# Patient Record
Sex: MALE | Race: WHITE | NOT HISPANIC OR LATINO | Employment: FULL TIME | URBAN - METROPOLITAN AREA
[De-identification: names, ages, dates, MRNs, and addresses within clinical notes are randomized per-mention and may not be internally consistent; named-entity substitution may affect disease eponyms.]

---

## 2022-01-12 ENCOUNTER — OFFICE VISIT (OUTPATIENT)
Dept: FAMILY MEDICINE CLINIC | Facility: CLINIC | Age: 37
End: 2022-01-12

## 2022-01-12 VITALS
HEIGHT: 71 IN | HEART RATE: 77 BPM | OXYGEN SATURATION: 95 % | DIASTOLIC BLOOD PRESSURE: 80 MMHG | WEIGHT: 195.4 LBS | BODY MASS INDEX: 27.35 KG/M2 | TEMPERATURE: 97.7 F | SYSTOLIC BLOOD PRESSURE: 144 MMHG

## 2022-01-12 DIAGNOSIS — Z13.220 LIPID SCREENING: ICD-10-CM

## 2022-01-12 DIAGNOSIS — R20.0 RIGHT ARM NUMBNESS: ICD-10-CM

## 2022-01-12 DIAGNOSIS — K21.9 GASTROESOPHAGEAL REFLUX DISEASE WITHOUT ESOPHAGITIS: ICD-10-CM

## 2022-01-12 DIAGNOSIS — E66.3 OVERWEIGHT WITH BODY MASS INDEX (BMI) OF 27 TO 27.9 IN ADULT: ICD-10-CM

## 2022-01-12 DIAGNOSIS — Z00.00 PREVENTATIVE HEALTH CARE: Primary | ICD-10-CM

## 2022-01-12 DIAGNOSIS — Z72.0 TOBACCO ABUSE: ICD-10-CM

## 2022-01-12 DIAGNOSIS — Z83.3 FAMILY HISTORY OF DIABETES MELLITUS: ICD-10-CM

## 2022-01-12 PROCEDURE — 99203 OFFICE O/P NEW LOW 30 MIN: CPT | Performed by: NURSE PRACTITIONER

## 2022-01-12 RX ORDER — GABAPENTIN 300 MG/1
CAPSULE ORAL
Qty: 14 CAPSULE | Refills: 0 | Status: SHIPPED | OUTPATIENT
Start: 2022-01-12

## 2022-01-12 NOTE — PROGRESS NOTES
Ja Hetser is a 36 y.o. male.     36-year-old white male smoker with no known medical history who comes in today to be established as new patient    Blood pressure 140/80 heart rate 76 with very small systolic murmur who denies any chest pain, dyspnea, tachycardia or dizziness    Patient's only complaint is right arm numbness from the elbow down at nighttime that is now beginning to happen during the day.  He states the arm and hand go completely and flaccid but will come back to life when he starts shaking his hand.  He states is keeping him awake at night.  He denies any injuries to neck shoulder or  elbow.  He works at a [email or web address] and probably does some repetitive type of work there.  He denies any neck pain.  I Patito place him on some gabapentin temporarily at bedtime to see if it helps him sleep.  He just started a new job and has not currently got his insurance yet so he is going to call me when he does and we will do a work-up for the arm numbness.  On assessment hands and fingers were ice cold and I suspect he has some secretory issues going on causing the numbness in his right arm however radial pulses were strong.    He also has acid reflux mainly after eating anything with the tomato-based and I informed the patient his main culprit is smoking.  He is trying to cut back on smoking and does use over-the-counter antacids when he does have a flareup.    Weight is 195 BMI 27.3.  He has had 2 COVID vaccines but does need to schedule an eye exam          Gabapentin 300 mg 1-2 nightly  Call office when your health insurance kicks in   monitor blood pressure at home  Consider getting COVID booster  Schedule eye exam  Fasting blood work         The following portions of the patient's history were reviewed and updated as appropriate: allergies, current medications, past family history, past medical history, past social history, past surgical history and problem list.    Vitals:    01/12/22 1320  "01/12/22 1322   BP: 154/98 144/80   BP Location: Right arm Right arm   Patient Position: Sitting Sitting   Cuff Size: Adult Adult   Pulse: 77    Temp: 97.7 °F (36.5 °C)    TempSrc: Infrared    SpO2: 95%    Weight: 88.6 kg (195 lb 6.4 oz)    Height: 180.3 cm (71\")      Body mass index is 27.25 kg/m².    Past Medical History:   Diagnosis Date   • GERD (gastroesophageal reflux disease)      History reviewed. No pertinent surgical history.  Family History   Problem Relation Age of Onset   • Cancer Father    • Heart attack Sister      Immunization History   Administered Date(s) Administered   • COVID-19 (MODERNA) 1st, 2nd, 3rd Dose Only 09/03/2021, 10/21/2021       No results found for any previous visit.         Review of Systems   Constitutional: Negative.    HENT: Negative.    Respiratory: Negative.    Cardiovascular: Negative.    Gastrointestinal: Negative.  Positive for GERD.   Genitourinary: Negative.    Musculoskeletal: Negative.    Skin: Negative.    Neurological: Positive for numbness.   Psychiatric/Behavioral: Negative.        Objective   Physical Exam    Procedures    Assessment/Plan   Diagnoses and all orders for this visit:    1. Preventative health care (Primary)  -     Cancel: Comprehensive Metabolic Panel  -     CBC & Differential; Future  -     Comprehensive Metabolic Panel; Future    2. Lipid screening  -     Cancel: Lipid Panel With LDL / HDL Ratio  -     Lipid Panel With LDL / HDL Ratio; Future    3. Family history of diabetes mellitus  -     Hemoglobin A1c; Future  -     Comprehensive Metabolic Panel; Future    4. Tobacco abuse    5. Gastroesophageal reflux disease without esophagitis    6. Overweight with body mass index (BMI) of 27 to 27.9 in adult    7. Right arm numbness    Other orders  -     gabapentin (NEURONTIN) 300 MG capsule; 1-2 capsules 30 minutes before bedtime  Dispense: 14 capsule; Refill: 0          Current Outpatient Medications:   •  gabapentin (NEURONTIN) 300 MG capsule, 1-2 " capsules 30 minutes before bedtime, Disp: 14 capsule, Rfl: 0           Aby Yadav, APRN1/12/202214:08 EST  This note has been electronically signed

## 2022-01-12 NOTE — PATIENT INSTRUCTIONS
Gabapentin 300 mg 1-2 nightly  Call office when your health insurance kicks in   monitor blood pressure at home  Consider getting COVID booster  Schedule eye exam  Fasting blood work

## 2023-05-08 ENCOUNTER — OFFICE VISIT (OUTPATIENT)
Dept: FAMILY MEDICINE CLINIC | Facility: CLINIC | Age: 38
End: 2023-05-08
Payer: MEDICAID

## 2023-05-08 VITALS
DIASTOLIC BLOOD PRESSURE: 80 MMHG | HEART RATE: 90 BPM | BODY MASS INDEX: 30.69 KG/M2 | SYSTOLIC BLOOD PRESSURE: 122 MMHG | WEIGHT: 219.2 LBS | OXYGEN SATURATION: 98 % | TEMPERATURE: 97.5 F | HEIGHT: 71 IN

## 2023-05-08 DIAGNOSIS — G89.29 CHRONIC LEFT SHOULDER PAIN: ICD-10-CM

## 2023-05-08 DIAGNOSIS — Z72.0 TOBACCO ABUSE: ICD-10-CM

## 2023-05-08 DIAGNOSIS — Z83.3 FAMILY HISTORY OF DIABETES MELLITUS: ICD-10-CM

## 2023-05-08 DIAGNOSIS — M25.512 CHRONIC LEFT SHOULDER PAIN: ICD-10-CM

## 2023-05-08 DIAGNOSIS — E66.09 CLASS 1 OBESITY DUE TO EXCESS CALORIES WITHOUT SERIOUS COMORBIDITY WITH BODY MASS INDEX (BMI) OF 30.0 TO 30.9 IN ADULT: ICD-10-CM

## 2023-05-08 DIAGNOSIS — Z00.00 PREVENTATIVE HEALTH CARE: Primary | ICD-10-CM

## 2023-05-08 DIAGNOSIS — Z13.220 LIPID SCREENING: ICD-10-CM

## 2023-05-08 PROBLEM — E66.811 CLASS 1 OBESITY DUE TO EXCESS CALORIES WITHOUT SERIOUS COMORBIDITY WITH BODY MASS INDEX (BMI) OF 30.0 TO 30.9 IN ADULT: Status: ACTIVE | Noted: 2023-05-08

## 2023-05-08 NOTE — PROGRESS NOTES
"    Ja Hester is a 38 y.o. male.     History of Present Illness  38-year-old white male smoker with no known medical history who has not been here since January 2022 comes in today with complaints of left shoulder pain.  He has had shoulder pain for about 3 years but states it is gotten worse lately.  He has limited range of motion without pain going up from outside of body or reaching behind.  He states the pain is right in front of the shoulder.  We will get x-ray today and probably refer him to Ortho depending on x-ray results    Vital signs are stable.  Patient has gained 14 pounds with a weight of 219 and a BMI of 30.6.  He has had 2 COVID vaccines but has not had eye exam for quite a while            Left shoulder x-ray  Fasting blood work  Schedule eye exam  Diet and exercise       The following portions of the patient's history were reviewed and updated as appropriate: allergies, current medications, past family history, past medical history, past social history, past surgical history and problem list.    Vitals:    05/08/23 1515   BP: 122/80   BP Location: Right arm   Patient Position: Sitting   Cuff Size: Adult   Pulse: 90   Temp: 97.5 °F (36.4 °C)   TempSrc: Oral   SpO2: 98%   Weight: 99.4 kg (219 lb 3.2 oz)   Height: 180.3 cm (71\")     Body mass index is 30.57 kg/m².    Past Medical History:   Diagnosis Date   • GERD (gastroesophageal reflux disease)      History reviewed. No pertinent surgical history.  Family History   Problem Relation Age of Onset   • Cancer Father    • Heart attack Sister      Immunization History   Administered Date(s) Administered   • COVID-19 (MODERNA) 1st,2nd,3rd Dose Monovalent 09/03/2021, 10/21/2021       No results found for any previous visit.         Review of Systems   Constitutional: Negative.    HENT: Negative.    Respiratory: Negative.    Genitourinary: Negative.    Musculoskeletal:        Left shoulder pain   Skin: Negative.    Neurological: Negative.  "   Psychiatric/Behavioral: Negative.        Objective   Physical Exam  Constitutional:       Appearance: Normal appearance.   HENT:      Head: Normocephalic.   Cardiovascular:      Rate and Rhythm: Normal rate and regular rhythm.      Pulses: Normal pulses.      Heart sounds: Normal heart sounds.   Pulmonary:      Effort: Pulmonary effort is normal.      Breath sounds: Normal breath sounds.   Abdominal:      General: Bowel sounds are normal.   Musculoskeletal:      Comments: Limited range of motion with left shoulder no known injury   Skin:     General: Skin is warm and dry.   Neurological:      General: No focal deficit present.      Mental Status: He is alert and oriented to person, place, and time.   Psychiatric:         Mood and Affect: Mood normal.         Behavior: Behavior normal.         Procedures    Assessment & Plan   Diagnoses and all orders for this visit:    1. Preventative health care (Primary)  -     CBC & Differential    2. Family history of diabetes mellitus  -     Comprehensive Metabolic Panel  -     Hemoglobin A1c    3. Lipid screening  -     Lipid Panel With LDL / HDL Ratio    4. Chronic left shoulder pain  -     XR Shoulder 2+ View Left    5. Class 1 obesity due to excess calories without serious comorbidity with body mass index (BMI) of 30.0 to 30.9 in adult    6. Tobacco abuse        No current outpatient medications on file.           Aby Yadav, ABDI 5/8/2023 16:01 EDT  This note has been electronically signed

## 2023-05-09 DIAGNOSIS — M25.512 CHRONIC LEFT SHOULDER PAIN: Primary | ICD-10-CM

## 2023-05-09 DIAGNOSIS — G89.29 CHRONIC LEFT SHOULDER PAIN: Primary | ICD-10-CM

## 2023-05-10 LAB
ALBUMIN SERPL-MCNC: 4.8 G/DL (ref 4–5)
ALBUMIN/GLOB SERPL: 2 {RATIO} (ref 1.2–2.2)
ALP SERPL-CCNC: 81 IU/L (ref 44–121)
ALT SERPL-CCNC: 29 IU/L (ref 0–44)
AST SERPL-CCNC: 20 IU/L (ref 0–40)
BASOPHILS # BLD AUTO: 0 X10E3/UL (ref 0–0.2)
BASOPHILS NFR BLD AUTO: 0 %
BILIRUB SERPL-MCNC: 0.3 MG/DL (ref 0–1.2)
BUN SERPL-MCNC: 13 MG/DL (ref 6–20)
BUN/CREAT SERPL: 13 (ref 9–20)
CALCIUM SERPL-MCNC: 9.8 MG/DL (ref 8.7–10.2)
CHLORIDE SERPL-SCNC: 105 MMOL/L (ref 96–106)
CHOLEST SERPL-MCNC: 170 MG/DL (ref 100–199)
CO2 SERPL-SCNC: 24 MMOL/L (ref 20–29)
CREAT SERPL-MCNC: 0.98 MG/DL (ref 0.76–1.27)
EGFRCR SERPLBLD CKD-EPI 2021: 102 ML/MIN/1.73
EOSINOPHIL # BLD AUTO: 0.1 X10E3/UL (ref 0–0.4)
EOSINOPHIL NFR BLD AUTO: 1 %
ERYTHROCYTE [DISTWIDTH] IN BLOOD BY AUTOMATED COUNT: 12.4 % (ref 11.6–15.4)
GLOBULIN SER CALC-MCNC: 2.4 G/DL (ref 1.5–4.5)
GLUCOSE SERPL-MCNC: 107 MG/DL (ref 70–99)
HBA1C MFR BLD: 5.6 % (ref 4.8–5.6)
HCT VFR BLD AUTO: 47.7 % (ref 37.5–51)
HDLC SERPL-MCNC: 40 MG/DL
HGB BLD-MCNC: 16.5 G/DL (ref 13–17.7)
IMM GRANULOCYTES # BLD AUTO: 0 X10E3/UL (ref 0–0.1)
IMM GRANULOCYTES NFR BLD AUTO: 1 %
LDLC SERPL CALC-MCNC: 111 MG/DL (ref 0–99)
LDLC/HDLC SERPL: 2.8 RATIO (ref 0–3.6)
LYMPHOCYTES # BLD AUTO: 1.5 X10E3/UL (ref 0.7–3.1)
LYMPHOCYTES NFR BLD AUTO: 17 %
MCH RBC QN AUTO: 30.7 PG (ref 26.6–33)
MCHC RBC AUTO-ENTMCNC: 34.6 G/DL (ref 31.5–35.7)
MCV RBC AUTO: 89 FL (ref 79–97)
MONOCYTES # BLD AUTO: 0.5 X10E3/UL (ref 0.1–0.9)
MONOCYTES NFR BLD AUTO: 5 %
NEUTROPHILS # BLD AUTO: 6.7 X10E3/UL (ref 1.4–7)
NEUTROPHILS NFR BLD AUTO: 76 %
PLATELET # BLD AUTO: 223 X10E3/UL (ref 150–450)
POTASSIUM SERPL-SCNC: 4.7 MMOL/L (ref 3.5–5.2)
PROT SERPL-MCNC: 7.2 G/DL (ref 6–8.5)
RBC # BLD AUTO: 5.37 X10E6/UL (ref 4.14–5.8)
SODIUM SERPL-SCNC: 143 MMOL/L (ref 134–144)
TRIGL SERPL-MCNC: 102 MG/DL (ref 0–149)
VLDLC SERPL CALC-MCNC: 19 MG/DL (ref 5–40)
WBC # BLD AUTO: 8.8 X10E3/UL (ref 3.4–10.8)

## 2024-02-29 ENCOUNTER — OFFICE VISIT (OUTPATIENT)
Dept: SPORTS MEDICINE | Facility: CLINIC | Age: 39
End: 2024-02-29
Payer: MEDICAID

## 2024-02-29 VITALS — WEIGHT: 219 LBS | BODY MASS INDEX: 30.66 KG/M2 | HEIGHT: 71 IN | OXYGEN SATURATION: 98 % | HEART RATE: 93 BPM

## 2024-02-29 DIAGNOSIS — M75.22 TENDINITIS OF LONG HEAD OF BICEPS BRACHII OF LEFT SHOULDER: Primary | ICD-10-CM

## 2024-02-29 DIAGNOSIS — M25.512 CHRONIC LEFT SHOULDER PAIN: ICD-10-CM

## 2024-02-29 DIAGNOSIS — G89.29 CHRONIC LEFT SHOULDER PAIN: ICD-10-CM

## 2024-02-29 RX ORDER — MELOXICAM 15 MG/1
15 TABLET ORAL DAILY
Qty: 30 TABLET | Refills: 2 | Status: SHIPPED | OUTPATIENT
Start: 2024-02-29

## 2024-02-29 NOTE — PROGRESS NOTES
"NEW VISIT    Patient: Ja Hester  ?  YOB: 1985    MRN: [id number]  ?  Chief Complaint   Patient presents with    Left Shoulder - Initial Evaluation      ?  HPI: Patient is a 39-year-old male presents today for chronic left shoulder pain.  He states symptoms started 4 years ago when he slipped and fell with an outstretched left arm with an audible pop.  Has treated conservatively since that time, but continues to have off-and-on anterior shoulder pain.  Has worsening pain typically the day after with any repetitive lifting activities, or push pull activities.  Occasional pain with sleep.  Associated stiffness and painful popping with range of motion.  Denies any locking or catching.  Denies numbness or tingling.  Has been utilizing topical Voltaren cream with minimal relief.  He denies any prior formal physical therapy or injections.  Not working at this time.  He is right-hand dominant.    Allergies: No Known Allergies    Past Medical History:   Diagnosis Date    GERD (gastroesophageal reflux disease)      History reviewed. No pertinent surgical history.  Social History     Occupational History    Not on file   Tobacco Use    Smoking status: Former    Smokeless tobacco: Never   Vaping Use    Vaping Use: Some days    Substances: Nicotine   Substance and Sexual Activity    Alcohol use: Defer    Drug use: Defer    Sexual activity: Defer      Social History     Social History Narrative    Not on file     Family History   Problem Relation Age of Onset    Cancer Father     Heart attack Sister        Review of Systems  Constitutional: Negative.  Negative for fever.   Musculoskeletal: Positive for joint pain  Skin: Negative.  Negative for rash and wound.    Neurological: Negative for numbness.     Vitals:    02/29/24 0859   Pulse: 93   SpO2: 98%   Weight: 99.3 kg (219 lb)   Height: 180.3 cm (71\")        Physical Exam  Constitutional: Patient is oriented to person, place, and time. Appears well-developed " and well-nourished.   Head: Normocephalic and atraumatic.   Pulmonary/Chest: Effort normal.   Musculoskeletal:   See detailed exam below   Neurological: Alert and oriented to person, place, and time. No sensory deficit. Coordination normal.   Skin: Skin is warm and dry. Capillary refill takes less than 2 seconds. No rash noted. No erythema.     The left shoulder is without obvious signs of acute bony deformity, swelling, erythema or ecchymosis.  There is mild tenderness over the anterior joint line.  There is tenderness over the long head of the biceps tendon and bicipital groove.  There is mild tenderness at the AC joint. There is no tenderness at the SC joint. Active range of motion is normal, uncomfortable, and symmetrical.  Passive range of motion is full, uncomfortable, and symmetrical. Impingement signs are mildly positive with Neer, Harding, and crossover tests. Instability tests are negative with anterior and posterior drawer, and sulcus test. Speeds and Yergason's tests are positive. Black Diamond's test is positive. Phyllis's test is positive for pain without weakness.  Rotator cuff strength is full.  The neck and opposite shoulder are otherwise normal and stable. Gait is pain-free and tandem.    Diagnostics:  XR - 1 Result   I have personally reviewed Results for orders placed in visit on 05/08/23    XR SHOULDER 2+ VW LEFT 5/8/2023   and my interpretation of the image is as follows: No acute osseous abnormality, soft tissue abnormality or degenerative change.  Normal left shoulder series.       Assessment:  Diagnoses and all orders for this visit:    1. Tendinitis of long head of biceps brachii of left shoulder (Primary)  -     meloxicam (Mobic) 15 MG tablet; Take 1 tablet by mouth Daily.  Dispense: 30 tablet; Refill: 2  -     Ambulatory Referral to Physical Therapy    2. Chronic left shoulder pain  -     meloxicam (Mobic) 15 MG tablet; Take 1 tablet by mouth Daily.  Dispense: 30 tablet; Refill: 2  -      Ambulatory Referral to Physical Therapy        Plan    Above diagnosis and plan discussed with the patient in office today.  I did personally review his prior x-ray imaging of his left shoulder as noted above negative for acute osseous abnormality.  On exam today symptoms focal over long head of the biceps tendon at the bicipital groove, with lesser symptoms with labral testing.  Mild impingement signs, and rotator cuff strength well-preserved.  No specific noted shoulder instability on exam today.  Minimal conservative management at this time, as such we discussed starting with conservative treatment for likely biceps tendinitis potential low-grade SLAP lesion.  We will start with oral anti-inflammatory in the form of Mobic as well as formal physical therapy at [facility] per patient preference.  We also discussed potential bicipital sheath corticosteroid injection, but will hold at this time.  If worsening symptoms could revisit this in the future, would have him follow-up at [place] office to do this under ultrasound guidance.  Will plan on follow-up in 3 months to monitor progress with conservative management, or sooner as needed.  Bicipital tendon sheath corticosteroid injection discussed.  Will hold at this time  Physical Therapy discussed and referral placed as noted above  Activity modifications discussed and recommended.  Limit heavy lifting or push pull activities based on symptoms  Rest, ice, compression, and elevation (RICE) therapy.   Stretching and strengthening exercises  Prescription for Mobic as noted above.  Encouraged regular use for the next 2 weeks, then as needed afterwards  Follow up in 3 month(s) or sooner as needed    Date of encounter: 02/29/2024   Edison Benton DO    Electronically signed by Edison Benton DO, 02/29/24, 8:59 AM EST.    Disclaimer: Please note that areas of this note were completed with computer voice recognition software.  Quite often unanticipated grammatical,  syntax, homophones, and other interpretive errors are inadvertently transcribed by the computer software. Please excuse any errors that have escaped final proofreading.

## 2024-03-01 ENCOUNTER — PATIENT ROUNDING (BHMG ONLY) (OUTPATIENT)
Dept: SPORTS MEDICINE | Facility: CLINIC | Age: 39
End: 2024-03-01
Payer: MEDICAID

## 2024-03-01 ENCOUNTER — PATIENT ROUNDING (BHMG ONLY) (OUTPATIENT)
Dept: ORTHOPEDIC SURGERY | Facility: CLINIC | Age: 39
End: 2024-03-01
Payer: MEDICAID

## 2024-05-20 DIAGNOSIS — G89.29 CHRONIC LEFT SHOULDER PAIN: ICD-10-CM

## 2024-05-20 DIAGNOSIS — M75.22 TENDINITIS OF LONG HEAD OF BICEPS BRACHII OF LEFT SHOULDER: ICD-10-CM

## 2024-05-20 DIAGNOSIS — M25.512 CHRONIC LEFT SHOULDER PAIN: ICD-10-CM

## 2024-05-20 RX ORDER — MELOXICAM 15 MG/1
15 TABLET ORAL DAILY
Qty: 30 TABLET | Refills: 0 | Status: SHIPPED | OUTPATIENT
Start: 2024-05-20

## 2024-06-20 DIAGNOSIS — M75.22 TENDINITIS OF LONG HEAD OF BICEPS BRACHII OF LEFT SHOULDER: ICD-10-CM

## 2024-06-20 DIAGNOSIS — M25.512 CHRONIC LEFT SHOULDER PAIN: ICD-10-CM

## 2024-06-20 DIAGNOSIS — G89.29 CHRONIC LEFT SHOULDER PAIN: ICD-10-CM

## 2024-06-24 RX ORDER — MELOXICAM 15 MG/1
15 TABLET ORAL DAILY
Qty: 30 TABLET | Refills: 0 | Status: SHIPPED | OUTPATIENT
Start: 2024-06-24

## 2024-07-22 DIAGNOSIS — M75.22 TENDINITIS OF LONG HEAD OF BICEPS BRACHII OF LEFT SHOULDER: ICD-10-CM

## 2024-07-22 DIAGNOSIS — M25.512 CHRONIC LEFT SHOULDER PAIN: ICD-10-CM

## 2024-07-22 DIAGNOSIS — G89.29 CHRONIC LEFT SHOULDER PAIN: ICD-10-CM

## 2024-07-22 RX ORDER — MELOXICAM 15 MG/1
15 TABLET ORAL DAILY
Qty: 30 TABLET | Refills: 0 | Status: SHIPPED | OUTPATIENT
Start: 2024-07-22

## 2024-09-04 DIAGNOSIS — M25.512 CHRONIC LEFT SHOULDER PAIN: ICD-10-CM

## 2024-09-04 DIAGNOSIS — G89.29 CHRONIC LEFT SHOULDER PAIN: ICD-10-CM

## 2024-09-04 DIAGNOSIS — M75.22 TENDINITIS OF LONG HEAD OF BICEPS BRACHII OF LEFT SHOULDER: ICD-10-CM

## 2024-09-04 RX ORDER — MELOXICAM 15 MG/1
15 TABLET ORAL DAILY
Qty: 30 TABLET | Refills: 0 | Status: SHIPPED | OUTPATIENT
Start: 2024-09-04

## 2024-10-09 DIAGNOSIS — M25.512 CHRONIC LEFT SHOULDER PAIN: ICD-10-CM

## 2024-10-09 DIAGNOSIS — M75.22 TENDINITIS OF LONG HEAD OF BICEPS BRACHII OF LEFT SHOULDER: ICD-10-CM

## 2024-10-09 DIAGNOSIS — G89.29 CHRONIC LEFT SHOULDER PAIN: ICD-10-CM

## 2024-10-09 RX ORDER — MELOXICAM 15 MG/1
15 TABLET ORAL DAILY
Qty: 30 TABLET | Refills: 0 | Status: SHIPPED | OUTPATIENT
Start: 2024-10-09

## 2025-02-06 ENCOUNTER — HOSPITAL ENCOUNTER (EMERGENCY)
Facility: HOSPITAL | Age: 40
Discharge: HOME | End: 2025-02-06
Payer: MEDICAID

## 2025-02-06 ENCOUNTER — APPOINTMENT (OUTPATIENT)
Dept: CT IMAGING | Facility: HOSPITAL | Age: 40
End: 2025-02-06
Payer: MEDICAID

## 2025-02-06 VITALS
SYSTOLIC BLOOD PRESSURE: 132 MMHG | WEIGHT: 218 LBS | OXYGEN SATURATION: 96 % | HEART RATE: 76 BPM | HEIGHT: 71 IN | TEMPERATURE: 98.2 F | DIASTOLIC BLOOD PRESSURE: 80 MMHG | BODY MASS INDEX: 30.52 KG/M2 | RESPIRATION RATE: 20 BRPM

## 2025-02-06 DIAGNOSIS — N48.89 PENILE PAIN: ICD-10-CM

## 2025-02-06 DIAGNOSIS — N21.1 URETHRAL STONE: Primary | ICD-10-CM

## 2025-02-06 LAB
ANION GAP SERPL CALCULATED.3IONS-SCNC: 9.6 MMOL/L (ref 5–15)
BASOPHILS # BLD AUTO: 0.03 10*3/MM3 (ref 0–0.2)
BASOPHILS NFR BLD AUTO: 0.4 % (ref 0–1.5)
BILIRUB UR QL STRIP: NEGATIVE
BUN SERPL-MCNC: 10 MG/DL (ref 6–20)
BUN/CREAT SERPL: 10 (ref 7–25)
C TRACH RRNA CVX QL NAA+PROBE: NOT DETECTED
CALCIUM SPEC-SCNC: 9.4 MG/DL (ref 8.6–10.5)
CHLORIDE SERPL-SCNC: 101 MMOL/L (ref 98–107)
CLARITY UR: CLEAR
CO2 SERPL-SCNC: 26.4 MMOL/L (ref 22–29)
COLOR UR: YELLOW
CREAT SERPL-MCNC: 1 MG/DL (ref 0.76–1.27)
DEPRECATED RDW RBC AUTO: 38.8 FL (ref 37–54)
EGFRCR SERPLBLD CKD-EPI 2021: 98.2 ML/MIN/1.73
EOSINOPHIL # BLD AUTO: 0.06 10*3/MM3 (ref 0–0.4)
EOSINOPHIL NFR BLD AUTO: 0.7 % (ref 0.3–6.2)
ERYTHROCYTE [DISTWIDTH] IN BLOOD BY AUTOMATED COUNT: 11.9 % (ref 12.3–15.4)
GLUCOSE SERPL-MCNC: 133 MG/DL (ref 65–99)
GLUCOSE UR STRIP-MCNC: NEGATIVE MG/DL
HCT VFR BLD AUTO: 43.3 % (ref 37.5–51)
HGB BLD-MCNC: 14.5 G/DL (ref 13–17.7)
HGB UR QL STRIP.AUTO: NEGATIVE
HOLD SPECIMEN: NORMAL
IMM GRANULOCYTES # BLD AUTO: 0.02 10*3/MM3 (ref 0–0.05)
IMM GRANULOCYTES NFR BLD AUTO: 0.2 % (ref 0–0.5)
KETONES UR QL STRIP: ABNORMAL
LEUKOCYTE ESTERASE UR QL STRIP.AUTO: NEGATIVE
LYMPHOCYTES # BLD AUTO: 1.81 10*3/MM3 (ref 0.7–3.1)
LYMPHOCYTES NFR BLD AUTO: 22.4 % (ref 19.6–45.3)
MCH RBC QN AUTO: 29.6 PG (ref 26.6–33)
MCHC RBC AUTO-ENTMCNC: 33.5 G/DL (ref 31.5–35.7)
MCV RBC AUTO: 88.4 FL (ref 79–97)
MONOCYTES # BLD AUTO: 0.58 10*3/MM3 (ref 0.1–0.9)
MONOCYTES NFR BLD AUTO: 7.2 % (ref 5–12)
N GONORRHOEA RRNA SPEC QL NAA+PROBE: NOT DETECTED
NEUTROPHILS NFR BLD AUTO: 5.59 10*3/MM3 (ref 1.7–7)
NEUTROPHILS NFR BLD AUTO: 69.1 % (ref 42.7–76)
NITRITE UR QL STRIP: NEGATIVE
NRBC BLD AUTO-RTO: 0 /100 WBC (ref 0–0.2)
PH UR STRIP.AUTO: 7 [PH] (ref 5–8)
PLATELET # BLD AUTO: 229 10*3/MM3 (ref 140–450)
PMV BLD AUTO: 9.3 FL (ref 6–12)
POTASSIUM SERPL-SCNC: 3.8 MMOL/L (ref 3.5–5.2)
PROT UR QL STRIP: NEGATIVE
RBC # BLD AUTO: 4.9 10*6/MM3 (ref 4.14–5.8)
SODIUM SERPL-SCNC: 137 MMOL/L (ref 136–145)
SP GR UR STRIP: 1.01 (ref 1–1.03)
UROBILINOGEN UR QL STRIP: ABNORMAL
WBC NRBC COR # BLD AUTO: 8.09 10*3/MM3 (ref 3.4–10.8)

## 2025-02-06 PROCEDURE — 99284 EMERGENCY DEPT VISIT MOD MDM: CPT

## 2025-02-06 PROCEDURE — 87491 CHLMYD TRACH DNA AMP PROBE: CPT | Performed by: NURSE PRACTITIONER

## 2025-02-06 PROCEDURE — 80048 BASIC METABOLIC PNL TOTAL CA: CPT | Performed by: NURSE PRACTITIONER

## 2025-02-06 PROCEDURE — 74176 CT ABD & PELVIS W/O CONTRAST: CPT

## 2025-02-06 PROCEDURE — 85025 COMPLETE CBC W/AUTO DIFF WBC: CPT | Performed by: NURSE PRACTITIONER

## 2025-02-06 PROCEDURE — 25810000003 SODIUM CHLORIDE 0.9 % SOLUTION: Performed by: NURSE PRACTITIONER

## 2025-02-06 PROCEDURE — 81003 URINALYSIS AUTO W/O SCOPE: CPT | Performed by: NURSE PRACTITIONER

## 2025-02-06 PROCEDURE — 87591 N.GONORRHOEAE DNA AMP PROB: CPT | Performed by: NURSE PRACTITIONER

## 2025-02-06 RX ORDER — HYDROCODONE BITARTRATE AND ACETAMINOPHEN 7.5; 325 MG/1; MG/1
1 TABLET ORAL EVERY 4 HOURS PRN
Qty: 10 TABLET | Refills: 0 | Status: SHIPPED | OUTPATIENT
Start: 2025-02-06

## 2025-02-06 RX ORDER — SODIUM CHLORIDE 0.9 % (FLUSH) 0.9 %
10 SYRINGE (ML) INJECTION AS NEEDED
Status: DISCONTINUED | OUTPATIENT
Start: 2025-02-06 | End: 2025-02-06 | Stop reason: HOSPADM

## 2025-02-06 RX ADMIN — SODIUM CHLORIDE 1000 ML: 9 INJECTION, SOLUTION INTRAVENOUS at 12:10

## 2025-02-06 NOTE — ED NOTES
Pt thinks that he has  a kidney stone lodged in his urethra.   Pt sts he was awoke with this pain this morning. Pt denies any flank pain.   He sts its painful to urinate and urine has foul odor.

## 2025-02-06 NOTE — CONSULTS
[facility]      Patient Identification:  NAME:  Ja Hester  Age:  40 y.o.   Sex:  male   :  1985   MRN:  [id number]       Chief complaint/Reason for consult: Kidney stone    History of present illness:  40 y.o. male with a history of renal calculi presented to ER on 2025 with complaints of a possible kidney stone lodged in his urethra.  Patient reported waking with pain this morning.  Denies any flank pain.  Reports discomfort with urination and a foul odor to his urine.      Past medical history:  Past Medical History:   Diagnosis Date    GERD (gastroesophageal reflux disease)        Past surgical history:  No past surgical history on file.    Allergies:  Patient has no known allergies.    Home medications:  (Not in a hospital admission)       Hospital medications:          [COMPLETED] Insert Peripheral IV **AND** sodium chloride    Family history:  Family History   Problem Relation Age of Onset    Cancer Father     Heart attack Sister        Social history:  Social History     Tobacco Use    Smoking status: Former    Smokeless tobacco: Never   Vaping Use    Vaping status: Some Days    Substances: Nicotine   Substance Use Topics    Alcohol use: Defer    Drug use: Defer       Objective:  TMax 24 hours:   Temp (24hrs), Av.2 °F (36.8 °C), Min:98.2 °F (36.8 °C), Max:98.2 °F (36.8 °C)      Vitals Ranges:   Temp:  [98.2 °F (36.8 °C)] 98.2 °F (36.8 °C)  Heart Rate:  [75-87] 75  Resp:  [20] 20  BP: (134-156)/(78-86) 151/80    Intake/Output Last 3 shifts:  No intake/output data recorded.     Physical Exam:    General Appearance:    Alert, cooperative, NAD   Lungs:     Respirations unlabored, no audible wheezing    Heart:    No cyanosis   Abdomen:     Soft, ND    :    No suprapubic distention          Results review:   I reviewed the patient's new clinical results.    Data review:  Lab Results (last 24 hours)       Procedure Component Value Units Date/Time    Basic Metabolic Panel  [[id number]]  (Abnormal) Collected: 02/06/25 1211    Specimen: Blood Updated: 02/06/25 1251     Glucose 133 mg/dL      BUN 10 mg/dL      Creatinine 1.00 mg/dL      Sodium 137 mmol/L      Potassium 3.8 mmol/L      Chloride 101 mmol/L      CO2 26.4 mmol/L      Calcium 9.4 mg/dL      BUN/Creatinine Ratio 10.0     Anion Gap 9.6 mmol/L      eGFR 98.2 mL/min/1.73     Narrative:      GFR Categories in Chronic Kidney Disease (CKD)      GFR Category          GFR (mL/min/1.73)    Interpretation  G1                     90 or greater         Normal or high (1)  G2                      60-89                Mild decrease (1)  G3a                   45-59                Mild to moderate decrease  G3b                   30-44                Moderate to severe decrease  G4                    15-29                Severe decrease  G5                    14 or less           Kidney failure          (1)In the absence of evidence of kidney disease, neither GFR category G1 or G2 fulfill the criteria for CKD.    eGFR calculation 2021 CKD-EPI creatinine equation, which does not include race as a factor    Urinalysis With Culture If Indicated - Urine, Clean Catch [[id number]]  (Abnormal) Collected: 02/06/25 1234    Specimen: Urine, Clean Catch Updated: 02/06/25 1245     Color, UA Yellow     Appearance, UA Clear     pH, UA 7.0     Specific Gravity, UA 1.007     Glucose, UA Negative     Ketones, UA Trace     Bilirubin, UA Negative     Blood, UA Negative     Protein, UA Negative     Leuk Esterase, UA Negative     Nitrite, UA Negative     Urobilinogen, UA 0.2 E.U./dL    Narrative:      In absence of clinical symptoms, the presence of pyuria, bacteria, and/or nitrites on the urinalysis result does not correlate with infection.  Urine microscopic not indicated.    Chlamydia trachomatis, Neisseria gonorrhoeae, PCR - Urine, Urine, Random Void [[id number]] Collected: 02/06/25 1235    Specimen: Urine, Random Void Updated: 02/06/25 1241    Extra Tubes  [[id number]] Collected: 02/06/25 1211    Specimen: Blood, Venous Line Updated: 02/06/25 1230    Narrative:      The following orders were created for panel order Extra Tubes.  Procedure                               Abnormality         Status                     ---------                               -----------         ------                     Gold Top - SST[[id number]]                                   Final result                 Please view results for these tests on the individual orders.    [facility] - SST [[id number]] Collected: 02/06/25 1211    Specimen: Blood Updated: 02/06/25 1230     Extra Tube Hold for add-ons.     Comment: Auto resulted.       CBC & Differential [[id number]]  (Abnormal) Collected: 02/06/25 1211    Specimen: Blood Updated: 02/06/25 1225    Narrative:      The following orders were created for panel order CBC & Differential.  Procedure                               Abnormality         Status                     ---------                               -----------         ------                     CBC Auto Differential[[id number]]        Abnormal            Final result                 Please view results for these tests on the individual orders.    CBC Auto Differential [[id number]]  (Abnormal) Collected: 02/06/25 1211    Specimen: Blood Updated: 02/06/25 1225     WBC 8.09 10*3/mm3      RBC 4.90 10*6/mm3      Hemoglobin 14.5 g/dL      Hematocrit 43.3 %      MCV 88.4 fL      MCH 29.6 pg      MCHC 33.5 g/dL      RDW 11.9 %      RDW-SD 38.8 fl      MPV 9.3 fL      Platelets 229 10*3/mm3      Neutrophil % 69.1 %      Lymphocyte % 22.4 %      Monocyte % 7.2 %      Eosinophil % 0.7 %      Basophil % 0.4 %      Immature Grans % 0.2 %      Neutrophils, Absolute 5.59 10*3/mm3      Lymphocytes, Absolute 1.81 10*3/mm3      Monocytes, Absolute 0.58 10*3/mm3      Eosinophils, Absolute 0.06 10*3/mm3      Basophils, Absolute 0.03 10*3/mm3      Immature Grans, Absolute 0.02 10*3/mm3      nRBC 0.0 /100 WBC               Imaging:  Imaging Results (Last 24 Hours)       Procedure Component Value Units Date/Time    CT Abdomen Pelvis Without Contrast [[id number]] Collected: 02/06/25 1217     Updated: 02/06/25 1251    Narrative:      CT ABDOMEN PELVIS WO CONTRAST    Date of Exam: 2/6/2025 11:57 AM EST    Indication: flank pain/urethral pain/difficulty urinating.    Comparison: None available.    Technique: Axial CT images were obtained of the abdomen and pelvis without the administration of contrast. Sagittal and coronal reconstructions were performed.  Automated exposure control and iterative reconstruction methods were used.      FINDINGS:    Abdomen/Pelvis:    Lower Chest: Limited imaging of the lung bases is grossly clear    No free air is noted below the diaphragm.    Kidneys and renal collecting system: Urinary bladder is incompletely distended and otherwise grossly unremarkable in appearance. Limited noncontrast imaging of the left kidney is unremarkable in appearance. Collecting system is followed to the bladder   without acute abnormality. Right kidney demonstrates fullness of the proximal right renal collecting system and mild prominence of the proximal ureter no obstructing calculus is noted.    Organs: Limited noncontrast imaging of the liver, gallbladder, spleen, pancreas and adrenal glands are grossly unremarkable in appearance    GI/Bowel: Limited noncontrast imaging and small bowel are unremarkable in appearance. There is no suspicious mesenteric adenopathy or fluid collection noted. The ileocecal valve and the appendix appear unremarkable in appearance. The colon demonstrates   no acute abnormality    Pelvis: Bladder is incompletely distended. Prostate and pelvic structures demonstrate no definite acute abnormality.    There is a prominent calculus near the distal aspect of the penis overlying the expected position of the distal penile ureter. This measures approximately 8 x 13 x 12 mm in size. No obvious  dilation of the urethra noted    Peritoneum/Retroperitoneum: The aorta is normal in caliber. There is no suspicious retroperitoneal adenopathy    Bones/Soft Tissues: No acute osseous abnormality      Impression:      1.There is a large calculus within the distal aspect of the penis which appears to be within the distal penile ureter. There is mild prominence of the right renal collecting system and proximal ureter without evidence of obstructing calculus. This could   represent recently passed stone. Recommend consultation with urology for further management  2.No acute intra-abdominal or intrapelvic abnormality otherwise noted on limited noncontrast imaging.        Electronically Signed: Claude Estrella MD    2/6/2025 12:49 PM EST    Workstation ID: [id number]               Assessment:       * No active hospital problems. *      Urethral Calculi    Plan:     CT images reviewed showing large calculus within the distal aspect of the urethra.  Mild prominence of the right renal collecting system and proximal ureter that may represent recently passed stone.  No evidence of obstructing calculi.  Patient voiding well.  Patient already has prescription for tamsulosin and pain medicine at home that was provided by primary care today.  Push fluids  If does not pass follow-up in the office tomorrow.  Return to ER if unable to void  Discharge from a urology standpoint    ABDI Ca  [facility]  [facility], [address]  [place] [address]  Office: [phone]  Available via [employer] Secure Chat  02/06/25  13:23 EST     Reviewed and discussed with Dr. Felix.

## 2025-02-06 NOTE — DISCHARGE INSTRUCTIONS
Take pain medication as prescribed.  Drink plenty of fluids.  Follow-up with urology tomorrow as scheduled.  Return for new or worsening symptoms.

## 2025-02-06 NOTE — ED PROVIDER NOTES
Subjective   History of Present Illness  Patient is a 40-year-old white male with history of kidney stones presents today with complaints of pain in his penis.  He states over last few weeks he has had some intermittent right flank pain.  He states yesterday it felt like he had passed a stone but now he states it feels like it is stuck in his urethra.  He reports pain when he attempts to urinate and does report that he has been able to pass urine without difficulty.  He denies any penile discharge or concern for STI.  Denies any nausea or vomiting.  No fever or chills.  Reports that his flank pain has resolved.      Review of Systems   Constitutional:  Negative for fever.   Gastrointestinal:  Negative for abdominal pain, nausea and vomiting.   Genitourinary:  Positive for dysuria, flank pain and penile pain. Negative for frequency, penile discharge, penile swelling, scrotal swelling and testicular pain.       Past Medical History:   Diagnosis Date    GERD (gastroesophageal reflux disease)        No Known Allergies    No past surgical history on file.    Family History   Problem Relation Age of Onset    Cancer Father     Heart attack Sister        Social History     Socioeconomic History    Marital status:    Tobacco Use    Smoking status: Former    Smokeless tobacco: Never   Vaping Use    Vaping status: Some Days    Substances: Nicotine   Substance and Sexual Activity    Alcohol use: Defer    Drug use: Defer    Sexual activity: Defer           Objective   Physical Exam  Vital signs and triage nurse note reviewed.  Constitutional: Awake, alert; well-developed and well-nourished. No acute distress is noted.  Cardiovascular: Regular rate and rhythm, normal S1-S2.  No murmur noted.  Pulmonary: Respiratory effort regular nonlabored, breath sounds clear to auscultation all fields.  Abdomen: Soft, nontender, nondistended with normoactive bowel sounds; no rebound or guarding.  No CVAT.  No rash.  Musculoskeletal:  Independent range of motion of all extremities with no palpable tenderness or edema.  Skin: Flesh tone, warm, dry, intact; no erythematous or petechial rash or lesion.    Procedures           ED Course      Labs Reviewed   BASIC METABOLIC PANEL - Abnormal; Notable for the following components:       Result Value    Glucose 133 (*)     All other components within normal limits    Narrative:     GFR Categories in Chronic Kidney Disease (CKD)      GFR Category          GFR (mL/min/1.73)    Interpretation  G1                     90 or greater         Normal or high (1)  G2                      60-89                Mild decrease (1)  G3a                   45-59                Mild to moderate decrease  G3b                   30-44                Moderate to severe decrease  G4                    15-29                Severe decrease  G5                    14 or less           Kidney failure          (1)In the absence of evidence of kidney disease, neither GFR category G1 or G2 fulfill the criteria for CKD.    eGFR calculation 2021 CKD-EPI creatinine equation, which does not include race as a factor   URINALYSIS W/ CULTURE IF INDICATED - Abnormal; Notable for the following components:    Ketones, UA Trace (*)     All other components within normal limits    Narrative:     In absence of clinical symptoms, the presence of pyuria, bacteria, and/or nitrites on the urinalysis result does not correlate with infection.  Urine microscopic not indicated.   CBC WITH AUTO DIFFERENTIAL - Abnormal; Notable for the following components:    RDW 11.9 (*)     All other components within normal limits   CHLAMYDIA TRACHOMATIS, NEISSERIA GONORRHOEAE, PCR - Normal   CBC AND DIFFERENTIAL    Narrative:     The following orders were created for panel order CBC & Differential.  Procedure                               Abnormality         Status                     ---------                               -----------         ------                      CBC Auto Differential[[id number]]        Abnormal            Final result                 Please view results for these tests on the individual orders.   EXTRA TUBES    Narrative:     The following orders were created for panel order Extra Tubes.  Procedure                               Abnormality         Status                     ---------                               -----------         ------                     Gold Top - SST[[id number]]                                   Final result                 Please view results for these tests on the individual orders.   [facility]     CT Abdomen Pelvis Without Contrast    Result Date: 2/6/2025  1.There is a large calculus within the distal aspect of the penis which appears to be within the distal penile ureter. There is mild prominence of the right renal collecting system and proximal ureter without evidence of obstructing calculus. This could represent recently passed stone. Recommend consultation with urology for further management 2.No acute intra-abdominal or intrapelvic abnormality otherwise noted on limited noncontrast imaging. Electronically Signed: Claude Estrella MD  2/6/2025 12:49 PM EST  Workstation ID: [id number]   Medications   sodium chloride 0.9 % bolus 1,000 mL (0 mL Intravenous Stopped 2/6/25 1240)                                                      Medical Decision Making  Patient presents today with the above complaint.    He had the above exam and evaluation.  IV was established.  Labs and CT were obtained.    Workup: CBC and metabolic panel are unremarkable.  Urinalysis shows trace ketones, no blood or evidence of infection.  CT abdomen pelvis independently interpreted by Dr. Elias reviewed by myself shows urethral stone.  Radiologist reports there is a large calculus within the distal aspect of the penis which appears to be within the distal penile ureter.  There is mild prominence of the right renal collecting system and proximal ureter  without evidence of obstructing calculus.  This could represent recently passed stone.    On reexamination patient is resting quietly no distress.  He has no new complaints.  He is well-appearing hemodynamically stable and afebrile.    Case was discussed with urology who states patient may be discharged home and they will follow him up in the office tomorrow.    Findings and plan discussed with patient.  He will be discharged home with prescription for Norco and instructed follow-up with urology tomorrow.  He was given warning signs for prompt return to ED voiced understanding.  Inspect reviewed.    Problems Addressed:  Penile pain: complicated acute illness or injury  Urethral stone: complicated acute illness or injury    Amount and/or Complexity of Data Reviewed  Labs: ordered.  Radiology: ordered.    Risk  Prescription drug management.        Final diagnoses:   Urethral stone   Penile pain       ED Disposition  ED Disposition       ED Disposition   Discharge    Condition   Stable    Comment   --               [facility]  [address] [facility] [address]  [phone]             Medication List        New Prescriptions      HYDROcodone-acetaminophen 7.5-325 MG per tablet  Commonly known as: NORCO  Take 1 tablet by mouth Every 4 (Four) Hours As Needed for Moderate Pain or Severe Pain.               Where to Get Your Medications        These medications were sent to [facility] [address] [place] [address] [place] - [address] [facility] - [phone]  - [phone] FX  [address] [facility] [place] [address]      Phone: [phone]   HYDROcodone-acetaminophen 7.5-325 MG per tablet            Aimee Truong APRN  02/06/25 [id number]